# Patient Record
Sex: FEMALE
[De-identification: names, ages, dates, MRNs, and addresses within clinical notes are randomized per-mention and may not be internally consistent; named-entity substitution may affect disease eponyms.]

---

## 2023-11-13 ENCOUNTER — DASHBOARD ENCOUNTERS (OUTPATIENT)
Age: 78
End: 2023-11-13

## 2023-11-13 ENCOUNTER — OFFICE VISIT (OUTPATIENT)
Dept: URBAN - METROPOLITAN AREA CLINIC 113 | Facility: CLINIC | Age: 78
End: 2023-11-13
Payer: COMMERCIAL

## 2023-11-13 VITALS
SYSTOLIC BLOOD PRESSURE: 155 MMHG | HEIGHT: 63 IN | HEART RATE: 84 BPM | TEMPERATURE: 97.1 F | BODY MASS INDEX: 29.23 KG/M2 | RESPIRATION RATE: 16 BRPM | WEIGHT: 165 LBS | DIASTOLIC BLOOD PRESSURE: 87 MMHG

## 2023-11-13 DIAGNOSIS — R74.8 ELEVATED LIVER ENZYMES: ICD-10-CM

## 2023-11-13 DIAGNOSIS — K21.9 CHRONIC GERD: ICD-10-CM

## 2023-11-13 DIAGNOSIS — K76.0 NAFLD (NONALCOHOLIC FATTY LIVER DISEASE): ICD-10-CM

## 2023-11-13 DIAGNOSIS — K59.09 CHRONIC CONSTIPATION: ICD-10-CM

## 2023-11-13 PROBLEM — 707724006: Status: ACTIVE | Noted: 2023-11-13

## 2023-11-13 PROBLEM — 235595009: Status: ACTIVE | Noted: 2023-11-13

## 2023-11-13 PROBLEM — 236069009: Status: ACTIVE | Noted: 2023-11-13

## 2023-11-13 PROBLEM — 1231824009: Status: ACTIVE | Noted: 2023-11-13

## 2023-11-13 PROCEDURE — 99204 OFFICE O/P NEW MOD 45 MIN: CPT | Performed by: INTERNAL MEDICINE

## 2023-11-13 RX ORDER — LORATADINE 10 MG
1 PACKET MIXED WITH 8 OUNCES OF FLUID TABLET,DISINTEGRATING ORAL ONCE A DAY
OUTPATIENT

## 2023-11-13 RX ORDER — METOPROLOL TARTRATE 100 MG/1
1/2 TAB TABLET, FILM COATED ORAL DAILY
Status: ACTIVE | COMMUNITY

## 2023-11-13 RX ORDER — FAMOTIDINE 20 MG/1
1 TABLET AT BEDTIME AS NEEDED TABLET, FILM COATED ORAL ONCE A DAY
Status: ACTIVE | COMMUNITY

## 2023-11-13 RX ORDER — LORATADINE 10 MG
1 PACKET MIXED WITH 8 OUNCES OF FLUID TABLET,DISINTEGRATING ORAL ONCE A DAY
Status: ACTIVE | COMMUNITY

## 2023-11-13 RX ORDER — CYCLOSPORINE 0.5 MG/ML
1 DROP INTO AFFECTED EYE EMULSION OPHTHALMIC TWICE A DAY
Status: ACTIVE | COMMUNITY

## 2023-11-13 RX ORDER — VALSARTAN 40 MG/1
1 TABLET TABLET ORAL TWICE A DAY
Status: ACTIVE | COMMUNITY

## 2023-11-13 RX ORDER — FAMOTIDINE 20 MG/1
1 TABLET AT BEDTIME AS NEEDED TABLET, FILM COATED ORAL ONCE A DAY
OUTPATIENT

## 2023-11-13 RX ORDER — FUROSEMIDE 20 MG/1
1 TABLET TABLET ORAL ONCE A DAY
Status: ACTIVE | COMMUNITY

## 2023-11-13 RX ORDER — ORAL SEMAGLUTIDE 3 MG/1
AS DIRECTED TABLET ORAL
Status: ACTIVE | COMMUNITY

## 2023-11-13 NOTE — HPI-TODAY'S VISIT:
Ms. 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  She has reflux which is fairly controlled with Pepcid.  She has constipation and uses Miralax as needed.  SHe otrherwise denies abdominal pain, nausea, vomiting, change in bowel habits, blood in the stool or weight loss. She was put on Rybelsus for weight loss measures, but hasnt lost any weight yet.  She has trouble exercising because of knee pain.  Her liver enzymes have been elevated for at least 20 years.  She was on lipitor in 2022 but developed a rash so she stopped it.  She also had the covid vaccine at the same time as the rash.  She has not been on any cholesterol medications since then.  She cant take NSAIDs either as she had a reaction to celebrex and thinks it caused her liver enzymes to go up.  No family history of colon cancer, inflammatory bowel disease GI cancers, peptic ulcer disease or chronic liver disease.  No significant alcohol use, smoking or illicit drug use.   Liver fibrosis, fibrosis test acti test panel 7/21/2023 revealed a score of 0.72 which correlates with F3-F4 fibrosis, score of 0.48 which correlates with A1-A2 and minimal activity for inflammationLabs 1/10/2023 /25/2022 BUN 17, creatinine 0.85, glucose 103, AST 45, ALT 54, alk phos 85, T. bili 0.5, albumin 4, TSH 2.8 Labs 6/30/2023 AST 50, ALT 67, alk phos 84, T. bili 0.7, albumin 4.4; WBC 8.9, hemoglobin 13.5, MCV 99, platelet 1341/10/2023 AST 44, ALT 58, alk phos 97, T. bili 0.8 Right upper quadrant ultrasound 7/21/2023 revealed mild hepatic steatosis and increased echogenicity, normal gallbladder, normal bile duct  Previous EGD with Dr. Trujillo in [place] April 2022 revealed a normal esophagus, multiple benign-appearing gastric polyps, mild nonerosive gastritis, small nonbleeding diverticulum in the second portion duodenum.Colonoscopy April 2022 revealed a 3 mm ascending colon polyp, 5 mm splenic flexure polyp, multiple diverticula throughout the entire colon, otherwise normal colon.Gastric pathology revealed inactive gastritis, negative H. pylori, duodenal biopsies revealed intact villous architecture, fundic gland polyps and no evidence of sprue.  The polyps were benign and revealed lymphoid aggregate